# Patient Record
Sex: FEMALE | Race: WHITE
[De-identification: names, ages, dates, MRNs, and addresses within clinical notes are randomized per-mention and may not be internally consistent; named-entity substitution may affect disease eponyms.]

---

## 2018-02-20 ENCOUNTER — HOSPITAL ENCOUNTER (OUTPATIENT)
Dept: HOSPITAL 92 - BICMAMMO | Age: 73
Discharge: HOME | End: 2018-02-20
Payer: MEDICARE

## 2018-02-20 DIAGNOSIS — D24.2: ICD-10-CM

## 2018-02-20 DIAGNOSIS — D24.1: ICD-10-CM

## 2018-02-20 DIAGNOSIS — N60.31: ICD-10-CM

## 2018-02-20 DIAGNOSIS — N60.32: ICD-10-CM

## 2018-02-20 DIAGNOSIS — N63.10: Primary | ICD-10-CM

## 2018-02-20 PROCEDURE — 77066 DX MAMMO INCL CAD BI: CPT

## 2018-02-20 PROCEDURE — G0279 TOMOSYNTHESIS, MAMMO: HCPCS

## 2018-08-22 ENCOUNTER — HOSPITAL ENCOUNTER (OUTPATIENT)
Dept: HOSPITAL 92 - BICULT | Age: 73
Discharge: HOME | End: 2018-08-22
Payer: MEDICARE

## 2018-08-22 DIAGNOSIS — M81.0: Primary | ICD-10-CM

## 2018-08-22 DIAGNOSIS — I25.10: ICD-10-CM

## 2018-08-22 PROCEDURE — 93880 EXTRACRANIAL BILAT STUDY: CPT

## 2018-09-21 ENCOUNTER — HOSPITAL ENCOUNTER (OUTPATIENT)
Dept: HOSPITAL 92 - BICCT | Age: 73
Discharge: HOME | End: 2018-09-21
Attending: INTERNAL MEDICINE
Payer: MEDICARE

## 2018-09-21 DIAGNOSIS — I65.21: Primary | ICD-10-CM

## 2018-09-21 LAB — ESTIMATED GFR-MDRD - POC: (no result)

## 2018-09-21 PROCEDURE — 82565 ASSAY OF CREATININE: CPT

## 2018-09-21 PROCEDURE — 70498 CT ANGIOGRAPHY NECK: CPT

## 2018-09-21 NOTE — CT
CT ANGIOGRAM NECK:

 

HISTORY:

Evaluate for carotid stenosis.

 

COMPARISON:

None.

 

CORRELATION:

Carotid ultrasound from 08/22/2018.

 

TECHNIQUE:

A CT angiogram of the head and neck are performed in the axial plane.  Three-dimensional reformatted 
images are submitted for interpretation.

 

FINDINGS:

The visualized brain parenchyma is unremarkable.  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
s with mucosal hypertrophy.  Fungal infection/inspissated mucus may be present.

 

The visualized orbits are unremarkable.

 

The aerodigestive tract is patent.  Limited evaluation of the antral oral cavity due to dental amalga
m artifact.  No obvious masses.  The midline fatty raphe of the tongue is preserved.  The epiglottis 
has a normal caliber.  The pre-epiglottic fat is preserved.  Symmetric attenuation of the parotid and
 submandibular glands.  Symmetric attenuation of the sternocleidomastoid muscles.

 

There are nonspecific, nonenlarged bilateral soft tissue neck lymph nodes.

 

There is an asymmetric prominent right piriform sinus.  Medial deviation of the right vocal cord is s
uggested.  Direct visualization is recommended.

 

The upper mediastinum and lung apices are unremarkable.

 

Cervical spine vertebral body height is maintained.  No fracture.  There are varying degrees of rosalio
inal stenosis.

 

CAROTID ANGIOGRAM:  The visualized aortic arch is unremarkable.

 

RIGHT CAROTID:  The origin of the right carotid artery has appropriate enhancement and luminal diamet
er.  The right common carotid artery has appropriate enhancement and luminal diameter.  There is exte
nsive calcified and noncalcified plaque involving the right carotid bifurcation and the proximal righ
t internal carotid artery.  There is a string sign.  There is evidence for severe stenosis, based upo
n NASCET criteria.

 

LEFT CAROTID:  The left carotid artery origin has appropriate enhancement and luminal diameter.  The 
left common carotid artery is unremarkable.  There is a small amount of calcified plaque in the left 
carotid bifurcation without significant stenosis, based upon NASCET criteria.

 

Both cervical vertebral arteries are unremarkable.  Bilateral subclavian arteries are unremarkable.

 

IMPRESSION:

Severe stenosis involving the right carotid bifurcation and proximal right internal carotid artery, b
ased on NASCET criteria.

 

The results of the study were discussed with Dr. Blanchard on 09/21/2018 at 1:41 p.m.

 

AUGUSTA MACK

 

POS: [facility]

## 2018-11-06 ENCOUNTER — HOSPITAL ENCOUNTER (INPATIENT)
Dept: HOSPITAL 92 - SURG A | Age: 73
LOS: 1 days | Discharge: HOME | DRG: 39 | End: 2018-11-07
Attending: THORACIC SURGERY (CARDIOTHORACIC VASCULAR SURGERY) | Admitting: THORACIC SURGERY (CARDIOTHORACIC VASCULAR SURGERY)
Payer: MEDICARE

## 2018-11-06 VITALS — DIASTOLIC BLOOD PRESSURE: 82 MMHG | SYSTOLIC BLOOD PRESSURE: 99 MMHG

## 2018-11-06 VITALS — BODY MASS INDEX: 30.5 KG/M2

## 2018-11-06 DIAGNOSIS — I65.21: Primary | ICD-10-CM

## 2018-11-06 LAB
ANION GAP SERPL CALC-SCNC: 14 MMOL/L (ref 10–20)
BASOPHILS # BLD AUTO: 0.1 THOU/UL (ref 0–0.2)
BASOPHILS NFR BLD AUTO: 0.7 % (ref 0–1)
BUN SERPL-MCNC: 12 MG/DL (ref 9.8–20.1)
CALCIUM SERPL-MCNC: 9.1 MG/DL (ref 7.8–10.44)
CHLORIDE SERPL-SCNC: 105 MMOL/L (ref 98–107)
CO2 SERPL-SCNC: 23 MMOL/L (ref 23–31)
CREAT CL PREDICTED SERPL C-G-VRATE: 86 ML/MIN (ref 70–130)
EOSINOPHIL # BLD AUTO: 0.3 THOU/UL (ref 0–0.7)
EOSINOPHIL NFR BLD AUTO: 3.6 % (ref 0–10)
GLUCOSE SERPL-MCNC: 126 MG/DL (ref 83–110)
HGB BLD-MCNC: 14.9 G/DL (ref 12–16)
LYMPHOCYTES # BLD: 2 THOU/UL (ref 1.2–3.4)
LYMPHOCYTES NFR BLD AUTO: 25.5 % (ref 21–51)
MCH RBC QN AUTO: 29.5 PG (ref 27–31)
MCV RBC AUTO: 91.2 FL (ref 78–98)
MONOCYTES # BLD AUTO: 0.6 THOU/UL (ref 0.11–0.59)
MONOCYTES NFR BLD AUTO: 7 % (ref 0–10)
NEUTROPHILS # BLD AUTO: 5 THOU/UL (ref 1.4–6.5)
NEUTROPHILS NFR BLD AUTO: 63.2 % (ref 42–75)
PLATELET # BLD AUTO: 323 THOU/UL (ref 130–400)
POTASSIUM SERPL-SCNC: 3.7 MMOL/L (ref 3.5–5.1)
RBC # BLD AUTO: 5.05 MILL/UL (ref 4.2–5.4)
SODIUM SERPL-SCNC: 138 MMOL/L (ref 136–145)
WBC # BLD AUTO: 7.9 THOU/UL (ref 4.8–10.8)

## 2018-11-06 PROCEDURE — 36415 COLL VENOUS BLD VENIPUNCTURE: CPT

## 2018-11-06 PROCEDURE — 94640 AIRWAY INHALATION TREATMENT: CPT

## 2018-11-06 PROCEDURE — 03CH0ZZ EXTIRPATION OF MATTER FROM RIGHT COMMON CAROTID ARTERY, OPEN APPROACH: ICD-10-PCS | Performed by: THORACIC SURGERY (CARDIOTHORACIC VASCULAR SURGERY)

## 2018-11-06 PROCEDURE — 85025 COMPLETE CBC W/AUTO DIFF WBC: CPT

## 2018-11-06 PROCEDURE — 88342 IMHCHEM/IMCYTCHM 1ST ANTB: CPT

## 2018-11-06 PROCEDURE — G0008 ADMIN INFLUENZA VIRUS VAC: HCPCS

## 2018-11-06 PROCEDURE — 90662 IIV NO PRSV INCREASED AG IM: CPT

## 2018-11-06 PROCEDURE — 03UM0KZ SUPPLEMENT RIGHT EXTERNAL CAROTID ARTERY WITH NONAUTOLOGOUS TISSUE SUBSTITUTE, OPEN APPROACH: ICD-10-PCS | Performed by: THORACIC SURGERY (CARDIOTHORACIC VASCULAR SURGERY)

## 2018-11-06 PROCEDURE — 80048 BASIC METABOLIC PNL TOTAL CA: CPT

## 2018-11-06 PROCEDURE — 88184 FLOWCYTOMETRY/ TC 1 MARKER: CPT

## 2018-11-06 PROCEDURE — 90471 IMMUNIZATION ADMIN: CPT

## 2018-11-06 PROCEDURE — 88341 IMHCHEM/IMCYTCHM EA ADD ANTB: CPT

## 2018-11-06 PROCEDURE — 88307 TISSUE EXAM BY PATHOLOGIST: CPT

## 2018-11-06 RX ADMIN — CEFAZOLIN SODIUM SCH GM: 2 SOLUTION INTRAVENOUS at 14:36

## 2018-11-06 RX ADMIN — CEFAZOLIN SODIUM SCH GM: 2 SOLUTION INTRAVENOUS at 23:05

## 2018-11-06 RX ADMIN — Medication SCH ML: at 20:06

## 2018-11-06 NOTE — OP
DATE OF PROCEDURE:  11/06/2018

 

PREOPERATIVE DIAGNOSIS:  Asymptomatic right carotid stenosis.

 

POSTOPERATIVE DIAGNOSIS:  Asymptomatic right carotid stenosis.

 

PROCEDURE:

1.  Right carotid endarterectomy with patch angioplasty.

2.  Excision of right jugular lymph node.

 

SURGEON:  Vin Foster M.D.

 

ANESTHESIA:  General endotracheal.

 

ESTIMATED BLOOD LOSS:  Less than 50.

 

SPECIMENS:  Right jugular lymph node sent for routine pathology.

 

PROCEDURE IN DETAIL:  After consent was obtained, the patient was brought to operating room and place
d in supine position on the operating table.  Appropriate anesthetic monitor was placed and general e
ndotracheal anesthesia induced.  Head was rotated to the left.  Right neck was prepped and draped in 
usual sterile fashion.  Skin incision was made along the anterior border of sternocleidomastoid.  Chelsi
tysma was incised with electrocautery.  Sternocleidomastoid was mobilized.  Carotid sheath was entere
d.  Facial vein branch was divided between clips and ties.  Common internal and external carotid olivia
jonathan were serially exposed.  The patient was systemically heparinized.  After 3 minutes, internal, co
mmon, and external carotid arteries were clamped.  Incision was made on the common carotid artery ext
ended through the bulb onto the internal carotid artery.  A 10-[place] shunt was placed and ant
egrade flow reestablished.  There was no change in the cerebral monitoring.

 

Endarterectomy was begun on the common carotid artery extended through the bulb to the internal carot
id artery distal to the plaque.  There is good tapered distal endpoint.  Eversion endarterectomy was 
performed of the external carotid artery.  Medial fibers were debrided and artery was flushed with he
parinized saline.  Bovine pericardial patch was sewn in place with running 6-0 Prolene suture.  Prior
 to completion of the patch suture line, the shunt was clamped and removed.  Arteries were backbled a
nd flushed with heparinized saline.  Patch suture line was completed.  Antegrade flow was reestablish
ed up the external carotid artery.  Ten seconds later, antegrade flow was reestablished up the intern
al carotid artery.  A 25 mg of protamine was administered.  Hemostasis was ensured.  The wound was in
filtrated with 0.5% Marcaine with epinephrine.  The wound was then closed in layers and Dermabond isabel
lied to the skin.  The patient was awakened, extubated, and neurologically unchanged from her preoper
ative state.

## 2018-11-07 VITALS — TEMPERATURE: 98.3 F

## 2018-11-07 RX ADMIN — CEFAZOLIN SODIUM SCH GM: 2 SOLUTION INTRAVENOUS at 08:27

## 2018-11-07 RX ADMIN — Medication SCH ML: at 08:32

## 2018-11-07 NOTE — DIS
DATE OF ADMISSION:  11/06/2018

 

DATE OF DISCHARGE:  11/07/2018

 

DIAGNOSIS:  Asymptomatic right carotid stenosis.

 

PROCEDURES:  Right carotid endarterectomy with patch angioplasty.

 

DESCRIPTION OF HOSPITAL STAY:  Ms. Waller was admitted for an elective carotid endarterectomy.  She 
has done well.  She is neurologically intact postoperatively.  She is being discharged to home.  Foll
ow up with me in 2 weeks.

 

DISCHARGE MEDICATIONS:  Unchanged with the exception of the addition of Ultram 50 mg for pain.

## 2019-02-21 ENCOUNTER — HOSPITAL ENCOUNTER (OUTPATIENT)
Dept: HOSPITAL 92 - BICMAMMO | Age: 74
Discharge: HOME | End: 2019-02-21
Payer: MEDICARE

## 2019-02-21 DIAGNOSIS — Z12.31: Primary | ICD-10-CM

## 2019-02-21 PROCEDURE — 77067 SCR MAMMO BI INCL CAD: CPT

## 2019-02-21 PROCEDURE — 77063 BREAST TOMOSYNTHESIS BI: CPT

## 2020-02-14 ENCOUNTER — HOSPITAL ENCOUNTER (OUTPATIENT)
Dept: HOSPITAL 9 - MADRAD | Age: 75
Discharge: HOME | End: 2020-02-14
Attending: FAMILY MEDICINE
Payer: MEDICARE

## 2020-02-14 DIAGNOSIS — M79.671: Primary | ICD-10-CM

## 2020-02-14 NOTE — RAD
LEFT FOOT TWO VIEWS:

 

History: Acute pain, left foot. No trauma. 

 

FINDINGS/IMPRESSION: 

No fracture, dislocation, or bony destruction is seen. 

 

 

POS: [facility]

## 2020-02-14 NOTE — RAD
Left ANKLE 3 VIEWS:



HISTORY: Left ankle pain



FINDINGS: 



The ankle mortise is maintained. No acute fracture or dislocation is identified.



Reported By: Bradly Schilling 

Electronically Signed:  2/14/2020 12:24 PM

## 2020-02-24 ENCOUNTER — HOSPITAL ENCOUNTER (OUTPATIENT)
Dept: HOSPITAL 92 - BICMAMMO | Age: 75
Discharge: HOME | End: 2020-02-24
Payer: MEDICARE

## 2020-02-24 DIAGNOSIS — Z78.0: ICD-10-CM

## 2020-02-24 DIAGNOSIS — M85.852: ICD-10-CM

## 2020-02-24 DIAGNOSIS — Z12.31: Primary | ICD-10-CM

## 2020-02-24 DIAGNOSIS — Z13.820: ICD-10-CM

## 2020-02-24 PROCEDURE — 77063 BREAST TOMOSYNTHESIS BI: CPT

## 2020-02-24 PROCEDURE — 77067 SCR MAMMO BI INCL CAD: CPT

## 2020-02-24 PROCEDURE — 77080 DXA BONE DENSITY AXIAL: CPT

## 2020-02-24 NOTE — BD
EXAM: DEXA bone density examination



HISTORY: 74-year-old postmenopausal female for screening



COMPARISON: None



FINDINGS:

L1--bone mineral density 1.034 g/sq cm; T score 0.4

L2--bone mineral density 1.136 g/sq cm; T score 1.0

L3--bone mineral density 1.208 g/sq cm; T score 1.1

L4--bone mineral density 1.153 g/sq cm; T score 0.8

Total L1-L4--bone mineral density 1.137 g/sq cm; T score 0.8



Left femoral neck--bone mineral density0.630; T score -2.0

Total proximal left femur--bone mineral density 0.905; T score -0.3



IMPRESSION: Osteopenia. This patient has a 10 year WHO fracture risk of a major osteoporotic fracture
 of 12% and of a hip fracture of 2.5%.



Reported By: Stanton Carter 

Electronically Signed:  2/24/2020 11:37 AM

## 2020-02-24 NOTE — MMO
Bilateral MAMMO Bilat Screen [facility].

 

CLINICAL HISTORY:

Patient is 74 years old and is seen for screening. The patient has no family

history of breast cancer.  The patient has no personal history of cancer.

 

VIEWS:

The views performed were:  bilateral craniocaudal with tomosynthesis and

bilateral mediolateral oblique with tomosynthesis.

 

FILMS COMPARED:

The present examination has been compared to prior imaging studies performed at

[facility] on 01/18/2016, 01/19/2017, 02/20/2018 and 02/21/2019.

 

This study has been interpreted with the assistance of computer-aided detection.

 

MAMMOGRAM FINDINGS:

There are scattered fibroglandular densities.

 

There are no suspicious masses, suspicious calcifications, or new areas of

architectural distortion.

 

IMPRESSION:

THERE IS NO MAMMOGRAPHIC EVIDENCE OF MALIGNANCY.

 

A ROUTINE FOLLOW-UP MAMMOGRAM IN 1 YEAR IS RECOMMENDED.

 

THE RESULTS OF THIS EXAM WERE SENT TO THE PATIENT.

 

ACR BI-RADS Category 1 - Negative

 

MAMMOGRAPHY NOTE:

 1. A negative mammogram report should not delay a biopsy if a dominant of

 clinically suspicious mass is present.

 2. Approximately 10% to 15% of breast cancers are not detected by

 mammography.

 3. Adenosis and dense breasts may obscure an underlying neoplasm.

 

 

Reported by: RUSLAN LANTIGUA

Electonically Signed: [id number]